# Patient Record
Sex: MALE | Race: WHITE
[De-identification: names, ages, dates, MRNs, and addresses within clinical notes are randomized per-mention and may not be internally consistent; named-entity substitution may affect disease eponyms.]

---

## 2017-07-27 NOTE — PDGENHP
History and Physical





- Chief Complaint


Right Hip Pain





- History of Present Illness


1.   Bilateral~Femoroacetabular impingement (JADEN) Cam type, L> symptomatic than 

R





HISTORY OF PRESENT ILLNESS:


Rocis a 27 y.o.~very ~active male~who I have had the pleasure to consult on 

today.~I have enjoyed meeting him. He~lives in [place]. 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 

student at [employer]. ~He~is single; he~has no children. ~

Rocenjoys capoiera, running, weight training.





French's left~hip pain started January~2015, with no~previous complaints~and 

with no~recalled trauma or injury. ~French began to establish a more active 

lifestyle in August 2014, when he started to have left hip pain approximately 4 

months later. ~Rocdoes not have~a known history of hip dysplasia. 





Presentation today is of~anterior left~hip pain. ~The hip does not~wake him~at 

night and does~click and catch on him. Sitting can be uncomfortable for him. 

Rocdoes~report suffering from lower back pain episodes. 





Rochas~participated in physical therapy for the past year and has~tried 

other conservative measures including dry needling, chiropractic treatments and 

massage therapy. He~has not~received sufficient symptomatic improvement.





Rochas~utilized medication for pain management, including NSAID. Rochas 

used medication for 3-4 weeks.





Rocnotes mild issues with the right hip





Rocunderstands that he~has a hip and pelvis problem which should be 

researched and wishes to get a better understanding of his~hip status, followed 

by an establishment of a treatment strategy, hoping he~would be able to get 

back to his~well being active life.














History:








Past medical history: ~


None which is relevant 





Relevant familial history: Mother and Father: High Cholesterol





Past surgical history: 


None





Rochas never received general anesthesia.





I have reviewed, verified and agree with the past medical, surgical, family and 

social history.





Current Medications:~has a current medication list which includes the following 

prescription(s): ibuprofen, modafinil, and multivitamin.





ALLERGIES:~has No Known Allergies.














Objective:








Physical Examination:


Rocis 5 feet 11~inches tall and weighs~150~Lbs. French~is AAO x3; he~is well-

nourished, in NAD. Skin is warm and dry. ~Breathing is non-labored. ~CV with 

RRR by pulse. Abdomen is soft, NTND. ~Currently, he~walks with a normal~gait. He

~has~no leg length discrepancy and presents~with mild~signs of joint laxity.~~

Beighton Score 3. ~He~is fit looking. ~~


Trendelenburg sign is negative and proprioception~is reduced, right~side.


Lower spine examination is negative~for sciatic or femoral nerve irritation 

with negative~SLR &~femoral stretch tests. Range of motion of the spine is 

normal~for flexion, extension, and rotations, with no~associated pain. ~


SIJs examination is normal with abnormal bilateral~LENARD in relation and local 

tenderness.


Strength, Sensation and pulses are normal - bilaterally


Ankles and knees exams are normal~and no~mal-alignment is evident. 





Hip ROM (degrees):











 ER


 At 90~hip FL IR


 At 90~hip FL IR


 Neutral hip ER


 Neutral hip AB AD FL EX


 


R 65 0 15 40 50 10 120 10


 


L 55 0 10 35 55 10 105 10








Specific hip and pelvis tests:











 Quadrant LENARD Roll Add. Longus


 


R +++ +++ +++ +++


 (hip capsule)


 


L +++


 >R +++


 >R Negative +++


 (hip capsule)














 Glut. Med ITB Pos. Imp


 


R Negative


 (strong) Negative


 (strong) Negative


 (hip flexor tightness)


 


L Negative


 (strong) Negative


 (strong) Negative








Squeeze test measured strong


Bony Symphysis pubis is pain free to touch while concentric activity of the 

rectus abdominis, does not~produce pain at its insertion.


Ilio Psos specific tests are negative for pain during cycling for both hips~and 

remarkable for non painful snap


Greater trochanteric burse is pain free on both hips.


Piriformis tests: FAIR is negative, with no local signs of neuritis related to 

sciatic nerve.


Thigh circumference is symmetric with no evidence for muscle atrophy on both~

sides.


Hamstrings tests are negative~functional contraction and positive for tender to 

palpation~tendinopathy, left side. ~Hamstring muscle testing: strong 

bilaterally.





On a daily basis, the following percentages reflect French's overall total pain:


Left ~hip: 80 %


Right hip : 20 %





Imaging:


Radiology studies which I~have personally reviewed, analyzed and measured are 

below:





XR: 


AP of the hip and pelvis: 


Performed in a good~technique 


Coccyx to pubic symphysis distance 1.5 cm.


6 caudal


Specific measurements show: 














 NSA~ Lat.


Cam LCE Lat. Pincer C.Over~sign Sharp's angle Head~Coverage % Sourcil~Angle  

ATDmm


 


R N ++ 32 - 12-1  40 73 4 N


 


L N ++ 35 - 12-1 37 75 2 N








[place]~Lines are preserved.


No Pathological signs are seen in the Symphysis Pubis. 


No Pathological signs are seen at the Ischial~tuberosity. ~~~~~~~~~~~~~














 Pos. wall sign Sup. Lat. OA Joint Space-WBZ Joint Space-Medial NAD


 


R ++ Negative 3.9 mm 3.7 mm 6.7 mm


 


L + + 4.4 mm 3.5 mm 7.7 mm


 


 Sclerosis ~~Dysplasia Cysts ISS Comments


 


R + Negative Negative ++ 


 


L + Negative Negative + 











X Table lateral: 


Anterior cam lesion is seen





Impression and plan:Raissa Brianis a 27 y.o.~active male~suffering from symptomatic bilateral hip pain, 

Left ~>>>~Right, due to Bilateral~Femoroacetabular impingement (JADEN) Cam type~

causing significant disability to him~and altering~his~sport and life 

activities.


Physical examination, imaging, and his~story correspond with the diagnosis 

mentioned above.





I have explained the diagnosis and its significance to Rocand we have 

discussed the various possible treatment options~and their implications~with 

him.


These include proceeding with conservative treatment while continuing to modify 

his~activities to avoid aggravating the hip further, resuming anti pain 

medications or intra articular injections (when needed) which can give 

temporary relief and a hip arthroscopy aiming to address the above pathology.





Rocwill review the info presented.


Rocwill decide to proceed with the surgical treatment option hoping to 

address his~daily symptoms and disability and improve his~function. He~will 

schedule at his~convenience. ~


French will consider the option of pursuing a simultaneous bilateral hip 

arthroscopy to address pain, possible labral damage, and bony pathology 

involved with bilateral femoroacetabular impingment.


CT with 3D recon will be done in order to evaluate femoral torsion and to pre 

plan an accurate and optimal volume and location of bony resection.





French~is happy with this plan.





I have also supplied him~with handouts, outlining the expected surgical 

treatment and rehab involved.





I wish French~all the best, 





~~


Shivani Samuels, ATC~


Rom Hutchinson MD





History Information





- Allergies/Home Medication List


Allergies/Adverse Reactions: 








peanut Allergy (Mild, Verified 07/25/17 13:38)


 Other-Enter Comments





Home Medications: 








Herbals/Supplements -Info Only  07/25/17 [Last Taken 07/25/17]


Lyrica 50mg (*)  BID 07/25/17 [Last Taken Unknown]


Propranolol HCl  PRN 07/25/17 [Last Taken Unknown]


Valium 5 MG (*)  PRN 07/25/17 [Last Taken Unknown]





I have personally reviewed and updated: medical history





- Social History


Smoking Status: Former smoker

## 2017-07-31 NOTE — POSTANESTH
Post Anesthetic Evaluation


Cardiovascular Status: Normal, Stable, Similar to Pre-Op Cond


Respiratory Status: Normal, Stable, Similar to Pre-op Cond.


Level of Consciousness/Mental Status: Can Participate in Eval, Mildly Sleepy, 

Arousable


Pain Control: Inadeq, Add Tx Required


Nausea/Vomiting Control: Adequate, Prn Tx Ordered


Complications Possibly Related to Anesthesia: None Noted

## 2017-07-31 NOTE — PDANEPAE
ANE History of Present Illness





R hip arthroscopy, femoroplasty, labral repair





ANE Past Medical History





- Cardiovascular History


Hx Hypertension: No


Hx Arrhythmias: No


Hx Chest Pain: No


Hx Coronary Artery / Peripheral Vascular Disease: No


Hx CHF / Valvular Disease: No


Hx Palpitations: No


Cardiovascular History Comment: CP 2015, W/U NEGATIVE





- Pulmonary History


Hx COPD: No


Hx Asthma/Reactive Airway Disease: No


Hx Recent Upper Respiratory Infection: No


Hx Oxygen in Use at Home: No


Hx Sleep Apnea: Yes


Sleep Apnea Screening Result - Last Documented: Positive


Pulmonary History Comment: barrie postive uses oral appliance, instructed pt to 

bring dos





- Neurologic History


Hx Cerebrovascular Accident: No


Hx Seizures: No


Hx Dementia: No





- Endocrine History


Hx Diabetes: No





- Renal History


Hx Renal Disorders: No





- Liver History


Hx Hepatic Disorders: No





- Neurological & Psychiatric Hx


Hx Neurological and Psychiatric Disorders: Yes


Neurological / Psychiatric History Comment: INSOMNIA.  ANXIETY





- Cancer History


Hx Cancer: No





- Congenital Disorder History


Hx Congenital Disorders: No





- GI History


Hx Gastrointestinal Disorders: Yes


Gastrointestinal History Comment: GERD.  COLONOSCOPY FOR LOWER GI PROBLEMS, NO 

DX MADE AND PROBLEMS RESOLVED AS OF 2016





- Other Health History


Other Health History: SEASONAL ALLERGIES.  wears glasses





- Chronic Pain History


Chronic Pain: Yes (right hip)





- Surgical History


Prior Surgeries: 04/04/16 left hip arthroscopy with femoroplasty and labral 

repair with Shani- Chicho.  WISDOM TOOTH EXTRACTION.  COLONOSCOPY





ANE Review of Systems


Review of systems is: negative





- Exercise capacity


METS (RN): 4 METS





ANE Patient History





- Allergies


Allergies/Adverse Reactions: 








peanut Allergy (Mild, Verified 07/25/17 13:38)


 Other-Enter Comments








- Home Medications


Home Medications: 








Herbals/Supplements -Info Only  07/25/17 [Last Taken 07/25/17]


Lyrica 50mg (*) 50 mg PO BID 07/25/17 [Last Taken 07/30/17 21:00]


Propranolol HCl  PRN 07/25/17 [Last Taken Unknown]


Valium 5 MG (*) 5 mg PO PRN 07/25/17 [Last Taken 07/30/17 21:00]








- NPO status


NPO Status: no food or drink >8 hours





- Anes Hx


Anes Hx: no prior problems





- Smoking Hx


Smoking Status: Former smoker





- Family Anes Hx


Family Anes Hx: none


Family Hx Anesthesia Complications: none





ANE Labs/Vital Signs





- Vital Signs


Height: 180.34 cm


Weight: 77.111 kg





ANE Physical Exam





- Airway


Neck exam: FROM


Mallampati Score: Class 2


Mouth exam: normal dental/mouth exam





- Pulmonary


Pulmonary: no respiratory distress





- Cardiovascular


Cardiovascular: regular rate and rhythym





- ASA Status


ASA Status: II





ANE Anesthesia Plan


Anesthesia Plan: general endotracheal anesthesia

## 2018-12-12 ENCOUNTER — HOSPITAL ENCOUNTER (OUTPATIENT)
Dept: HOSPITAL 80 - FIMAGING | Age: 28
End: 2018-12-12
Attending: ORTHOPAEDIC SURGERY
Payer: MEDICAID

## 2018-12-12 DIAGNOSIS — M25.551: Primary | ICD-10-CM

## 2019-01-09 ENCOUNTER — HOSPITAL ENCOUNTER (OUTPATIENT)
Dept: HOSPITAL 80 - FIMAGING | Age: 29
End: 2019-01-09
Attending: INTERNAL MEDICINE
Payer: MEDICAID

## 2019-01-09 DIAGNOSIS — R68.81: Primary | ICD-10-CM

## 2019-01-09 PROCEDURE — 78264 GASTRIC EMPTYING IMG STUDY: CPT

## 2019-01-09 PROCEDURE — A9541 TC99M SULFUR COLLOID: HCPCS

## 2019-02-11 ENCOUNTER — HOSPITAL ENCOUNTER (OUTPATIENT)
Dept: HOSPITAL 80 - FCPNEURO | Age: 29
End: 2019-02-11
Attending: PSYCHIATRY & NEUROLOGY
Payer: MEDICAID

## 2019-02-11 DIAGNOSIS — G47.33: Primary | ICD-10-CM
